# Patient Record
Sex: FEMALE | Race: OTHER | HISPANIC OR LATINO | URBAN - METROPOLITAN AREA
[De-identification: names, ages, dates, MRNs, and addresses within clinical notes are randomized per-mention and may not be internally consistent; named-entity substitution may affect disease eponyms.]

---

## 2018-11-16 ENCOUNTER — EMERGENCY (EMERGENCY)
Facility: HOSPITAL | Age: 63
LOS: 0 days | Discharge: HOME | End: 2018-11-16
Attending: EMERGENCY MEDICINE | Admitting: EMERGENCY MEDICINE

## 2018-11-16 VITALS
DIASTOLIC BLOOD PRESSURE: 83 MMHG | RESPIRATION RATE: 18 BRPM | OXYGEN SATURATION: 100 % | TEMPERATURE: 98 F | SYSTOLIC BLOOD PRESSURE: 177 MMHG | HEART RATE: 76 BPM

## 2018-11-16 DIAGNOSIS — E11.65 TYPE 2 DIABETES MELLITUS WITH HYPERGLYCEMIA: ICD-10-CM

## 2018-11-16 DIAGNOSIS — I10 ESSENTIAL (PRIMARY) HYPERTENSION: ICD-10-CM

## 2018-11-16 DIAGNOSIS — Z79.84 LONG TERM (CURRENT) USE OF ORAL HYPOGLYCEMIC DRUGS: ICD-10-CM

## 2018-11-16 LAB
ALBUMIN SERPL ELPH-MCNC: 4.4 G/DL — SIGNIFICANT CHANGE UP (ref 3.5–5.2)
ALP SERPL-CCNC: 96 U/L — SIGNIFICANT CHANGE UP (ref 30–115)
ALT FLD-CCNC: 15 U/L — SIGNIFICANT CHANGE UP (ref 0–41)
ANION GAP SERPL CALC-SCNC: 11 MMOL/L — SIGNIFICANT CHANGE UP (ref 7–14)
AST SERPL-CCNC: 16 U/L — SIGNIFICANT CHANGE UP (ref 0–41)
BASOPHILS # BLD AUTO: 0.02 K/UL — SIGNIFICANT CHANGE UP (ref 0–0.2)
BASOPHILS NFR BLD AUTO: 0.4 % — SIGNIFICANT CHANGE UP (ref 0–1)
BILIRUB SERPL-MCNC: <0.2 MG/DL — SIGNIFICANT CHANGE UP (ref 0.2–1.2)
BUN SERPL-MCNC: 19 MG/DL — SIGNIFICANT CHANGE UP (ref 10–20)
CALCIUM SERPL-MCNC: 9.3 MG/DL — SIGNIFICANT CHANGE UP (ref 8.5–10.1)
CHLORIDE SERPL-SCNC: 95 MMOL/L — LOW (ref 98–110)
CO2 SERPL-SCNC: 28 MMOL/L — SIGNIFICANT CHANGE UP (ref 17–32)
CREAT SERPL-MCNC: 1.1 MG/DL — SIGNIFICANT CHANGE UP (ref 0.7–1.5)
EOSINOPHIL # BLD AUTO: 0.02 K/UL — SIGNIFICANT CHANGE UP (ref 0–0.7)
EOSINOPHIL NFR BLD AUTO: 0.4 % — SIGNIFICANT CHANGE UP (ref 0–8)
GAS PNL BLDV: SIGNIFICANT CHANGE UP
GLUCOSE SERPL-MCNC: 344 MG/DL — HIGH (ref 70–99)
HCT VFR BLD CALC: 31.6 % — LOW (ref 37–47)
HGB BLD-MCNC: 10.4 G/DL — LOW (ref 12–16)
IMM GRANULOCYTES NFR BLD AUTO: 0.2 % — SIGNIFICANT CHANGE UP (ref 0.1–0.3)
LYMPHOCYTES # BLD AUTO: 2.31 K/UL — SIGNIFICANT CHANGE UP (ref 1.2–3.4)
LYMPHOCYTES # BLD AUTO: 44.7 % — SIGNIFICANT CHANGE UP (ref 20.5–51.1)
MCHC RBC-ENTMCNC: 30.1 PG — SIGNIFICANT CHANGE UP (ref 27–31)
MCHC RBC-ENTMCNC: 32.9 G/DL — SIGNIFICANT CHANGE UP (ref 32–37)
MCV RBC AUTO: 91.3 FL — SIGNIFICANT CHANGE UP (ref 81–99)
MONOCYTES # BLD AUTO: 0.37 K/UL — SIGNIFICANT CHANGE UP (ref 0.1–0.6)
MONOCYTES NFR BLD AUTO: 7.2 % — SIGNIFICANT CHANGE UP (ref 1.7–9.3)
NEUTROPHILS # BLD AUTO: 2.44 K/UL — SIGNIFICANT CHANGE UP (ref 1.4–6.5)
NEUTROPHILS NFR BLD AUTO: 47.1 % — SIGNIFICANT CHANGE UP (ref 42.2–75.2)
PLATELET # BLD AUTO: 259 K/UL — SIGNIFICANT CHANGE UP (ref 130–400)
POTASSIUM SERPL-MCNC: 4.8 MMOL/L — SIGNIFICANT CHANGE UP (ref 3.5–5)
POTASSIUM SERPL-SCNC: 4.8 MMOL/L — SIGNIFICANT CHANGE UP (ref 3.5–5)
PROT SERPL-MCNC: 7.1 G/DL — SIGNIFICANT CHANGE UP (ref 6–8)
RBC # BLD: 3.46 M/UL — LOW (ref 4.2–5.4)
RBC # FLD: 12.9 % — SIGNIFICANT CHANGE UP (ref 11.5–14.5)
SODIUM SERPL-SCNC: 134 MMOL/L — LOW (ref 135–146)
WBC # BLD: 5.17 K/UL — SIGNIFICANT CHANGE UP (ref 4.8–10.8)
WBC # FLD AUTO: 5.17 K/UL — SIGNIFICANT CHANGE UP (ref 4.8–10.8)

## 2018-11-16 RX ORDER — SODIUM CHLORIDE 9 MG/ML
2000 INJECTION INTRAMUSCULAR; INTRAVENOUS; SUBCUTANEOUS ONCE
Qty: 0 | Refills: 0 | Status: COMPLETED | OUTPATIENT
Start: 2018-11-16 | End: 2018-11-16

## 2018-11-16 RX ORDER — LOSARTAN POTASSIUM 100 MG/1
0 TABLET, FILM COATED ORAL
Qty: 0 | Refills: 0 | COMMUNITY

## 2018-11-16 RX ORDER — METFORMIN HYDROCHLORIDE 850 MG/1
1 TABLET ORAL
Qty: 0 | Refills: 0 | COMMUNITY

## 2018-11-16 RX ADMIN — SODIUM CHLORIDE 4000 MILLILITER(S): 9 INJECTION INTRAMUSCULAR; INTRAVENOUS; SUBCUTANEOUS at 18:14

## 2018-11-16 RX ADMIN — SODIUM CHLORIDE 2000 MILLILITER(S): 9 INJECTION INTRAMUSCULAR; INTRAVENOUS; SUBCUTANEOUS at 20:15

## 2018-11-16 NOTE — ED ADULT NURSE NOTE - OBJECTIVE STATEMENT
Patient present to ED with complains of high blood pressure and elevated blood sugar that started, patient is compliant with medication. Denies headache, dizziness, nausea, vomiting, and chest pain.

## 2018-11-16 NOTE — ED PROVIDER NOTE - NSFOLLOWUPCLINICS_GEN_ALL_ED_FT
[facility]  Medicine  [address], [place]   Phone: [phone]  Fax:   Follow Up Time:

## 2018-11-16 NOTE — ED PROVIDER NOTE - NS ED ROS FT
Constitutional: NAD  Eyes: No visual changes, eye pain or discharge.  ENMT: No hearing changes, pain, discharge or infections. No neck pain or stiffness.  Cardiac: No chest pain, SOB or edema. No chest pain with exertion.  Respiratory: No cough or respiratory distress.   GI: No nausea, vomiting, diarrhea or abdominal pain.  : No dysuria, frequency or burning.  MS: No myalgia, muscle weakness, joint pain or back pain.  Neuro: No headache, weakness. No LOC.  Skin: No skin rash.  Heme: No bruising

## 2018-11-16 NOTE — ED PROVIDER NOTE - MEDICAL DECISION MAKING DETAILS
Patient presented with hyperglycemia and hypertension, recently traveled from , looking to establish care in the [place]. Patient otherwise HD stable, labs unremarkable. Patient well appearing, no acute complaints. Given outpatient medicine follow up and patient agrees with plan to follow up as outpatient.

## 2018-11-16 NOTE — ED ADULT NURSE NOTE - NSIMPLEMENTINTERV_GEN_ALL_ED
Implemented All Universal Safety Interventions:  [place] to call system. Call bell, personal items and telephone within reach. Instruct patient to call for assistance. Room bathroom lighting operational. Non-slip footwear when patient is off stretcher. Physically safe environment: no spills, clutter or unnecessary equipment. Stretcher in lowest position, wheels locked, appropriate side rails in place.

## 2018-11-16 NOTE — ED PROVIDER NOTE - PHYSICAL EXAMINATION
General: AOx4, Non toxic appearing, NAD, speaking in full sentences.   Skin: Warm and dry, no acute rash.   Head:  Normocephalic, atraumatic.   EENT: PERRLA/EOMI, conjunctiva and sclera clear. MM moist, no nasal discharge.    Neck: Supple nt, no meningeal signs.   Heart RRR s1s2 nl, no rub/murmur.   Lungs- No retractions, BS equal, CTAB.   Abdomen: Soft ntnd no r/g.   Extremities- moves all, +equal distal pulses, brisk cap refill. No LE edema, calves nttp b/l.  Neuro: Sensation wnl, CN 2-12 grossly intact. +5/5 muscle strength throughout.  Psych: Cooperative, appropriate

## 2018-11-16 NOTE — ED PROVIDER NOTE - ATTENDING CONTRIBUTION TO CARE
62 year old female pmhx HTN, DM presenting with hyperglycemia. Patient also states her BP was found to be high in triage. States she just traveled from the [place] and wants to establish care here. Otherwise denies fevers, headache, vision changes, weakness/numbness, confusion, URI symptoms, neck pain, chest pain, back pain, dyspnea, cough, palpitations, nausea, vomiting, abdominal pain, diarrhea, constipation, blood in stool/dark stools, urinary symptoms, vaginal bleeding/discharge, leg swelling, rash, recent travel or sick contacts.    Vital Signs: I have reviewed the initial vital signs.  Constitutional: NAD, well-nourished, appears stated age, no acute distress.  HEENT: Airway patent, moist MM, no erythema/swelling/deformity of oral structures. EOMI, PERRLA.  CV: regular rate, regular rhythm, well-perfused extremities, 2+ b/l DP and radial pulses equal.  Lungs: BCTA, no increased WOB.  ABD: NTND, no guarding or rebound, no pulsatile mass, no hernias.   MSK: Neck supple, nontender, nl ROM, no stepoff. Chest nontender. Back nontender in TLS spine or to b/l bony structures or flanks. Ext nontender, nl rom, no deformity.   INTEG: Skin warm, dry, no rash.  NEURO: A&Ox3, CN II-XII intact, normal strength 5/5 all 4 ext, nl sensation throughout, normal speech and coordination.  PSYCH: Calm, cooperative, normal affect and interaction.    + - will check preliminary labs, give IVF, ensure patient not acidotic from hyperglycemia, recheck vitals. Patient otherwise extremely well appearing. If negative work up, will give patient outpatient medicine follow up.

## 2018-11-16 NOTE — ED PROVIDER NOTE - OBJECTIVE STATEMENT
61 y/o F pmh HTN, DM p/w hyperglycemia. Pt just traveled from [place], states she feels tired from all the walking. Pt also states her BP was elevated in triage. Denies f/c, HA, CP, SOB, n/v/d, abd pain, dysuria, polyuria.

## 2024-05-24 ENCOUNTER — EMERGENCY (EMERGENCY)
Facility: HOSPITAL | Age: 69
LOS: 0 days | Discharge: HOME | End: 2024-05-24
Attending: EMERGENCY MEDICINE
Payer: SELF-PAY

## 2024-05-24 VITALS
OXYGEN SATURATION: 100 % | SYSTOLIC BLOOD PRESSURE: 141 MMHG | RESPIRATION RATE: 17 BRPM | HEART RATE: 76 BPM | DIASTOLIC BLOOD PRESSURE: 79 MMHG | WEIGHT: 145.06 LBS | TEMPERATURE: 99 F

## 2024-05-24 DIAGNOSIS — M25.512 PAIN IN LEFT SHOULDER: ICD-10-CM

## 2024-05-24 DIAGNOSIS — E11.9 TYPE 2 DIABETES MELLITUS WITHOUT COMPLICATIONS: ICD-10-CM
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
## 2024-05-24 DIAGNOSIS — I10 ESSENTIAL (PRIMARY) HYPERTENSION: ICD-10-CM

## 2024-05-24 PROCEDURE — 82962 GLUCOSE BLOOD TEST: CPT

## 2024-05-24 PROCEDURE — 73060 X-RAY EXAM OF HUMERUS: CPT | Mod: 26,LT

## 2024-05-24 PROCEDURE — 73080 X-RAY EXAM OF ELBOW: CPT | Mod: LT

## 2024-05-24 PROCEDURE — 73080 X-RAY EXAM OF ELBOW: CPT | Mod: 26,LT

## 2024-05-24 PROCEDURE — 99284 EMERGENCY DEPT VISIT MOD MDM: CPT | Mod: 25

## 2024-05-24 PROCEDURE — 73030 X-RAY EXAM OF SHOULDER: CPT | Mod: 26,LT

## 2024-05-24 PROCEDURE — 73030 X-RAY EXAM OF SHOULDER: CPT | Mod: LT

## 2024-05-24 PROCEDURE — 99284 EMERGENCY DEPT VISIT MOD MDM: CPT

## 2024-05-24 PROCEDURE — 73060 X-RAY EXAM OF HUMERUS: CPT | Mod: LT

## 2024-05-24 NOTE — ED PROVIDER NOTE - PHYSICAL EXAMINATION
pt in NAD, AAOx3, head NC/AT, CN II-XII intact, PEERL, EOMi, neck (-) midline tenderness, lungs CTA B/L, CV S1S2 regular, abdomen soft/NT/ND/(+)BS, ext (-) deformty, (+) TTP over left shoulder joint and proximal humerus, (-) TTP over elbow/forearm/wrist, good , sensation intact, pulses intact, ambulating with steady gait

## 2024-05-24 NOTE — ED PROVIDER NOTE - OBJECTIVE STATEMENT
68-year-old female, past medical history of Diabetes, hypertension, comes in complaining of left shoulder pain which started 2 days ago after she slipped and fell in the shower landed on her left shoulder.  No head trauma/LOC, neck pain, chest pain/shortness of breath, abdominal pain.  Patient has no difficulty ambulating.  Has been taking Tylenol with minimal relief.

## 2024-05-24 NOTE — ED ADULT TRIAGE NOTE - CHIEF COMPLAINT QUOTE
Pt slipped and fell in shoulder two days ago. complaining of left arm pain. denies head trauma. denies a/c

## 2024-05-24 NOTE — ED PROVIDER NOTE - PATIENT PORTAL LINK FT
You can access the FollowMyHealth Patient Portal offered by [facility] by registering at the following website: [email or web address]. By joining [facility]’s FollowMyHealth portal, you will also be able to view your health information using other applications (apps) compatible with our system.

## 2024-05-24 NOTE — ED ADULT NURSE NOTE - NSFALLRISKINTERV_ED_ALL_ED

## 2024-05-24 NOTE — ED PROVIDER NOTE - NSFOLLOWUPINSTRUCTIONS_ED_ALL_ED_FT
Nuestros coordinadores de referencias del departamento de emergencias se comunicarán con usted en las próximas 24 a  48 horas de 9:00 a. m. a 5:00 p. m. (de lunes a viernes) con lana dennis de seguimiento. Espere lana llamada telefónica del hospital en gregorio período de tiempo. Si no recibe lana llamada o si tiene alguna pregunta o inquietud, puede comunicarse con kervin al [phone] [facility].

## 2024-05-24 NOTE — ED PROVIDER NOTE - CLINICAL SUMMARY MEDICAL DECISION MAKING FREE TEXT BOX
Pt with shoulder pain s/p fall 2 days ago. No fx noted on XR. Will sling and d/c with ortho follow up. Return precautions provided.

## 2024-06-07 ENCOUNTER — EMERGENCY (EMERGENCY)
Facility: HOSPITAL | Age: 69
LOS: 0 days | Discharge: HOME | End: 2024-06-07
Attending: STUDENT IN AN ORGANIZED HEALTH CARE EDUCATION/TRAINING PROGRAM
Payer: SELF-PAY

## 2024-06-07 VITALS
DIASTOLIC BLOOD PRESSURE: 70 MMHG | RESPIRATION RATE: 18 BRPM | OXYGEN SATURATION: 98 % | WEIGHT: 142.2 LBS | SYSTOLIC BLOOD PRESSURE: 132 MMHG | HEART RATE: 68 BPM | TEMPERATURE: 98 F

## 2024-06-07 DIAGNOSIS — E11.9 TYPE 2 DIABETES MELLITUS WITHOUT COMPLICATIONS: ICD-10-CM
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
## 2024-06-07 DIAGNOSIS — M25.512 PAIN IN LEFT SHOULDER: ICD-10-CM
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
## 2024-06-07 PROCEDURE — 99283 EMERGENCY DEPT VISIT LOW MDM: CPT

## 2024-06-07 RX ORDER — IBUPROFEN 200 MG
1 TABLET ORAL
Qty: 20 | Refills: 0
Start: 2024-06-07 | End: 2024-06-11

## 2024-06-07 RX ORDER — IBUPROFEN 200 MG
600 TABLET ORAL ONCE
Refills: 0 | Status: COMPLETED | OUTPATIENT
Start: 2024-06-07 | End: 2024-06-07

## 2024-06-07 RX ADMIN — Medication 600 MILLIGRAM(S): at 11:30

## 2024-06-07 NOTE — ED PROVIDER NOTE - OBJECTIVE STATEMENT
68-year-old female with past medical history of diabetes presents today for reevaluation of the injured shoulder.  Patient fell back in May 17 in the bathroom on her left shoulder denying loss of consciousness head trauma or anticoagulation use was evaluated here with negative radiographs and was set up to see orthopedics July 3.  Patient said since then she has been taking 2 Motrin every 8 hours and is experiencing pain while she sleeps on that side.  Patient is just arrived from the [place] and does not have a primary doctor.  Patient denies any numbness tingling radiating down the arm.

## 2024-06-07 NOTE — CHART NOTE - NSCHARTNOTEFT_GEN_A_CORE
[facility] MRN [id number] / Left message via  5/28 & 5/29 - MOUNIKA  Specialty: Orthopedic
[facility] MRN [id number] / Left message via  5/28 & 5/29 - MOUNIKA- sent to map chat 6/7 lw / Appointment made - MONUIKA     Specialty: PCP  Date: 7/29/2024  Time: 1:30 PM  Location: [address] [place] [address]   Clinician: MARIA FERNANDA Anderson

## 2024-06-07 NOTE — ED PROVIDER NOTE - CLINICAL SUMMARY MEDICAL DECISION MAKING FREE TEXT BOX
xr reviewed- no fx, likely rotator cuff injury, +/- component of frozen shoulder  ref coordinators called to expedite ortho f/u and to set up PT  medical management discussed

## 2024-06-07 NOTE — ED PROVIDER NOTE - PATIENT PORTAL LINK FT
You can access the FollowMyHealth Patient Portal offered by [facility] by registering at the following website: [email or web address]. By joining [facility]’s FollowMyHealth portal, you will also be able to view your health information using other applications (apps) compatible with our system.

## 2024-06-07 NOTE — ED PROVIDER NOTE - PHYSICAL EXAMINATION
CONSTITUTIONAL: Well-developed; well-nourished; in no acute distress.   SKIN: warm, dry  HEAD: Normocephalic; atraumatic.  EYES: PERRL, EOMI, no conjunctival erythema  ENT: No nasal discharge; airway clear.  NECK: Supple; non tender.  CARD: S1, S2 normal; no murmurs, gallops, or rubs. Regular rate and rhythm.   RESP: No wheezes, rales or rhonchi.  ABD: soft ntnd  EXT: Decreased ROM over the left shoulder with tenderness   LYMPH: No acute cervical adenopathy.  NEURO: Alert, oriented, grossly unremarkable  PSYCH: Cooperative, appropriate.

## 2024-06-07 NOTE — ED PROVIDER NOTE - ATTENDING CONTRIBUTION TO CARE
67 yo f hx dm  pt here for eval of R shoulder pain. pt fell may 17th and hit shoulder. xr at the time were negative and pt was ref to ortho. pt got appointment but it is not until july and pt has persistent pain. pt taking ibu for pain. pain worse w/ movements and sleeping on side.  no new trauma, cp, sob. pt keeping arm in sling    vss  gen- NAD, aaox3  card-rrr  lungs-ctab, no wheezing or rhonchi  msk- R shoulder tenderness, ROM limited by pain, pt able to lift shoulder to ~45 degrees, ROM to elbow and wrist intact but exacerbate shoulder pain, sensatio/str intact to hand mur, radial pulse 2+  neuro- full str/sensation, cn ii-xii grossly intact, normal coordination and gait

## 2024-06-07 NOTE — ED ADULT TRIAGE NOTE - CHIEF COMPLAINT QUOTE
C/o left arm injury and pain, s/p fall 1 week ago had xrays done and states are negative, was told to follow up with orthopedic but having pain and could not wait for appointment

## 2024-06-07 NOTE — ED PROVIDER NOTE - NSFOLLOWUPINSTRUCTIONS_ED_ALL_ED_FT
Our Emergency Department Referral Coordinators will be reaching out to you in the next 24-48 hours from 9:00am to 5:00pm to schedule a follow up appointment. Please expect a phone call from the hospital in that time frame. If you do not receive a call or if you have any questions or concerns, you can reach them at   [phone] [place] [address].      Dolor en el hombro  Shoulder Pain  Muchas cosas pueden provocar dolor en el hombro, por ejemplo:  Lana lesión en el hombro.  El uso excesivo del hombro.  Artritis.  La causa del dolor puede ser lo siguiente:  Inflamación.  Lana lesión en la articulación del hombro.  Lana lesión en un tendón, ligamento o hueso.  Siga estas indicaciones en tripp casa:  Esté atento a los cambios en los síntomas. Informe a tripp médico acerca de cualquier cambio. Siga estas indicaciones para aliviar el dolor.    Si tiene un cabestrillo extraíble:    Úselo margarita se lo haya indicado el médico. Quíteselo solamente margarita se lo haya indicado el médico.  Controle la piel alrededor del cabestrillo todos los días. Informe al médico acerca de cualquier inquietud.  Afloje el cabestrillo si los dedos de la mano se le adormecen, se le enfrían o siente hormigueo en ellos.  Mantenga el cabestrillo limpio.  Si el cabestrillo no es impermeable:  No deje que se moje.  Quíteselo para ducharse o para bañarse.  Mueva el brazo lo menos posible, segun mantenga la mano en movimiento para evitar la hinchazón.  Control del dolor, la rigidez y la hinchazón    Bag of ice on a towel on the skin.  Si se lo indican, aplique hielo sobre la dhaval dolorida.  Si tiene un cabestrillo o un inmovilizador desmontable, quíteselo margarita se lo haya indicado el médico.  Ponga el hielo en lana bolsa plástica.  Coloque lana toalla entre la piel y la bolsa.  Aplique el hielo charlotte 20 minutos, 2 o 3 veces por día.  Si la piel se le pone de color newberry brillante, retire el hielo de inmediato para evitar daños en la piel. El riesgo de daño es mayor si no puede sentir dolor, calor o frío.  Mueva los dedos de la mano con frecuencia para reducir la rigidez y la hinchazón.  Apriete lana pelota blanda o lana almohadilla de goma tanto margarita sea posible. [place] ayuda e prevenir la hinchazón en el hombro. También ayuda a fortalecer el brazo.  Indicaciones generales    Use los medicamentos de venta tori y los recetados solamente margarita se lo haya indicado el médico.  El ejercicio puede ayudar a controlar el dolor. Zuleima ejercicios si se lo indicó el médico.  Es posible que lo deriven a un fisioterapeuta para que lo ayude en el proceso de recuperación.  Cumpla con todas las visitas de seguimiento con el objeto de evitar complicaciones en el hombro y dolor crónico o discapacidad.  Comuníquese con un médico si:  El dolor no se rachel con los medicamentos.  Aparece un dolor nuevo en el brazo, la mano o los dedos.  Afloja el cabestrillo y el brazo, la mano o los dedos permanecen adormecidos, hinchados, doloridos o con hormigueo.  Solicite ayuda de inmediato si:  El brazo, la mano o los dedos se tornan de color flood o vignesh.  Esta información no tiene margarita fin reemplazar el consejo del médico. Asegúrese de hacerle al médico cualquier pregunta que tenga.

## 2024-07-03 ENCOUNTER — APPOINTMENT (OUTPATIENT)
Dept: ORTHOPEDIC SURGERY | Facility: CLINIC | Age: 69
End: 2024-07-03

## 2024-07-03 ENCOUNTER — OUTPATIENT (OUTPATIENT)
Dept: OUTPATIENT SERVICES | Facility: HOSPITAL | Age: 69
LOS: 1 days | End: 2024-07-03
Payer: COMMERCIAL

## 2024-07-03 DIAGNOSIS — M25.512 PAIN IN LEFT SHOULDER: ICD-10-CM

## 2024-07-03 DIAGNOSIS — Z00.00 ENCOUNTER FOR GENERAL ADULT MEDICAL EXAMINATION WITHOUT ABNORMAL FINDINGS: ICD-10-CM

## 2024-07-03 PROCEDURE — 99203 OFFICE O/P NEW LOW 30 MIN: CPT
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
## 2024-07-11 DIAGNOSIS — M25.512 PAIN IN LEFT SHOULDER: ICD-10-CM

## 2024-07-28 ENCOUNTER — OUTPATIENT (OUTPATIENT)
Dept: OUTPATIENT SERVICES | Facility: HOSPITAL | Age: 69
LOS: 1 days | End: 2024-07-28
Payer: SELF-PAY

## 2024-07-28 DIAGNOSIS — M25.512 PAIN IN LEFT SHOULDER: ICD-10-CM

## 2024-07-28 PROCEDURE — 73221 MRI JOINT UPR EXTREM W/O DYE: CPT | Mod: 26,LT

## 2024-07-28 PROCEDURE — 73221 MRI JOINT UPR EXTREM W/O DYE: CPT | Mod: LT

## 2024-07-29 ENCOUNTER — APPOINTMENT (OUTPATIENT)
Dept: INTERNAL MEDICINE | Facility: CLINIC | Age: 69
End: 2024-07-29

## 2024-07-29 DIAGNOSIS — M25.512 PAIN IN LEFT SHOULDER: ICD-10-CM

## 2024-08-07 ENCOUNTER — APPOINTMENT (OUTPATIENT)
Dept: ORTHOPEDIC SURGERY | Facility: CLINIC | Age: 69
End: 2024-08-07

## 2024-08-07 ENCOUNTER — OUTPATIENT (OUTPATIENT)
Dept: OUTPATIENT SERVICES | Facility: HOSPITAL | Age: 69
LOS: 1 days | End: 2024-08-07
Payer: SELF-PAY

## 2024-08-07 DIAGNOSIS — Z00.00 ENCOUNTER FOR GENERAL ADULT MEDICAL EXAMINATION WITHOUT ABNORMAL FINDINGS: ICD-10-CM

## 2024-08-07 PROCEDURE — 99213 OFFICE O/P EST LOW 20 MIN: CPT
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
## 2024-08-15 DIAGNOSIS — M25.512 PAIN IN LEFT SHOULDER: ICD-10-CM
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
## 2024-12-11 NOTE — ED ADULT NURSE NOTE - NS_SISCREENINGSR_GEN_ALL_ED
"Goal Outcome Evaluation:                         Marge Mcghee presents with functional mobility impairments which indicate the need for skilled intervention. Pt without any LOB or unsteadiness during gait utilizing RW. Pt required standing rest break due to feeling weak and expressing needing to focus in order to make it back to room. Pt safely returned to bed. Tolerating session today without incident. Will continue to follow and progress as tolerated.     Plan/Recommendations:   If medically appropriate, Moderate Intensity Therapy recommended post-acute care. This is recommended as therapy feels the patient would require 3-4 days per week and wouldn't tolerate \"3 hour daily\" rehab intensity. SNF would be the preferred choice. If the patient does not agree to SNF, arrange HH or OP depending on home bound status. If patient is medically complex, consider LTACH. Pt requires no DME at discharge.                    " Negative